# Patient Record
Sex: FEMALE | Race: WHITE | Employment: UNEMPLOYED | URBAN - METROPOLITAN AREA
[De-identification: names, ages, dates, MRNs, and addresses within clinical notes are randomized per-mention and may not be internally consistent; named-entity substitution may affect disease eponyms.]

---

## 2020-01-24 ENCOUNTER — HOSPITAL ENCOUNTER (INPATIENT)
Age: 20
LOS: 3 days | Discharge: HOME | DRG: 885 | End: 2020-01-27
Attending: PSYCHIATRY & NEUROLOGY | Admitting: PSYCHIATRY & NEUROLOGY
Payer: COMMERCIAL

## 2020-01-24 PROBLEM — F32.A DEPRESSION: Status: ACTIVE | Noted: 2020-01-24

## 2020-01-24 PROCEDURE — 65220000003 HC RM SEMIPRIVATE PSYCH

## 2020-01-24 PROCEDURE — 74011250637 HC RX REV CODE- 250/637: Performed by: PSYCHIATRY & NEUROLOGY

## 2020-01-24 RX ORDER — LAMOTRIGINE 25 MG/1
50 TABLET ORAL
COMMUNITY

## 2020-01-24 RX ORDER — DIPHENHYDRAMINE HYDROCHLORIDE 50 MG/ML
50 INJECTION, SOLUTION INTRAMUSCULAR; INTRAVENOUS
Status: DISCONTINUED | OUTPATIENT
Start: 2020-01-24 | End: 2020-01-24

## 2020-01-24 RX ORDER — ADHESIVE BANDAGE
30 BANDAGE TOPICAL DAILY PRN
Status: DISCONTINUED | OUTPATIENT
Start: 2020-01-24 | End: 2020-01-24

## 2020-01-24 RX ORDER — HALOPERIDOL 5 MG/ML
5 INJECTION INTRAMUSCULAR
Status: DISCONTINUED | OUTPATIENT
Start: 2020-01-24 | End: 2020-01-24

## 2020-01-24 RX ORDER — DIPHENHYDRAMINE HYDROCHLORIDE 50 MG/ML
50 INJECTION, SOLUTION INTRAMUSCULAR; INTRAVENOUS
Status: DISCONTINUED | OUTPATIENT
Start: 2020-01-24 | End: 2020-01-27 | Stop reason: HOSPADM

## 2020-01-24 RX ORDER — TRAZODONE HYDROCHLORIDE 50 MG/1
50 TABLET ORAL
Status: DISCONTINUED | OUTPATIENT
Start: 2020-01-24 | End: 2020-01-27 | Stop reason: HOSPADM

## 2020-01-24 RX ORDER — LORAZEPAM 2 MG/ML
1 INJECTION INTRAMUSCULAR
Status: DISCONTINUED | OUTPATIENT
Start: 2020-01-24 | End: 2020-01-27 | Stop reason: HOSPADM

## 2020-01-24 RX ORDER — HYDROXYZINE 25 MG/1
50 TABLET, FILM COATED ORAL
Status: DISCONTINUED | OUTPATIENT
Start: 2020-01-24 | End: 2020-01-24

## 2020-01-24 RX ORDER — OLANZAPINE 5 MG/1
5 TABLET ORAL
Status: DISCONTINUED | OUTPATIENT
Start: 2020-01-24 | End: 2020-01-24

## 2020-01-24 RX ORDER — HALOPERIDOL 5 MG/ML
5 INJECTION INTRAMUSCULAR
Status: DISCONTINUED | OUTPATIENT
Start: 2020-01-24 | End: 2020-01-27 | Stop reason: HOSPADM

## 2020-01-24 RX ORDER — HYDROXYZINE 25 MG/1
50 TABLET, FILM COATED ORAL
Status: DISCONTINUED | OUTPATIENT
Start: 2020-01-24 | End: 2020-01-27 | Stop reason: HOSPADM

## 2020-01-24 RX ORDER — ACETAMINOPHEN 325 MG/1
650 TABLET ORAL
Status: DISCONTINUED | OUTPATIENT
Start: 2020-01-24 | End: 2020-01-24

## 2020-01-24 RX ORDER — LORAZEPAM 2 MG/ML
1 INJECTION INTRAMUSCULAR
Status: DISCONTINUED | OUTPATIENT
Start: 2020-01-24 | End: 2020-01-24

## 2020-01-24 RX ORDER — PSEUDOEPHEDRINE HCL 30 MG
60 TABLET ORAL
Status: DISCONTINUED | OUTPATIENT
Start: 2020-01-24 | End: 2020-01-24

## 2020-01-24 RX ORDER — OLANZAPINE 5 MG/1
5 TABLET ORAL
Status: DISCONTINUED | OUTPATIENT
Start: 2020-01-24 | End: 2020-01-27 | Stop reason: HOSPADM

## 2020-01-24 RX ORDER — PSEUDOEPHEDRINE HCL 30 MG
60 TABLET ORAL
Status: DISCONTINUED | OUTPATIENT
Start: 2020-01-24 | End: 2020-01-27 | Stop reason: HOSPADM

## 2020-01-24 RX ORDER — TRAZODONE HYDROCHLORIDE 50 MG/1
50 TABLET ORAL
Status: DISCONTINUED | OUTPATIENT
Start: 2020-01-24 | End: 2020-01-24

## 2020-01-24 RX ORDER — ADHESIVE BANDAGE
30 BANDAGE TOPICAL DAILY PRN
Status: DISCONTINUED | OUTPATIENT
Start: 2020-01-24 | End: 2020-01-27 | Stop reason: HOSPADM

## 2020-01-24 RX ORDER — LAMOTRIGINE 25 MG/1
50 TABLET ORAL EVERY EVENING
Status: DISCONTINUED | OUTPATIENT
Start: 2020-01-24 | End: 2020-01-24

## 2020-01-24 RX ORDER — NICOTINE 7MG/24HR
1 PATCH, TRANSDERMAL 24 HOURS TRANSDERMAL DAILY
Status: DISCONTINUED | OUTPATIENT
Start: 2020-01-24 | End: 2020-01-27 | Stop reason: HOSPADM

## 2020-01-24 RX ORDER — BENZTROPINE MESYLATE 1 MG/1
1 TABLET ORAL
Status: DISCONTINUED | OUTPATIENT
Start: 2020-01-24 | End: 2020-01-24

## 2020-01-24 RX ORDER — NICOTINE 7MG/24HR
1 PATCH, TRANSDERMAL 24 HOURS TRANSDERMAL DAILY
Status: DISCONTINUED | OUTPATIENT
Start: 2020-01-24 | End: 2020-01-24

## 2020-01-24 RX ORDER — LAMOTRIGINE 25 MG/1
50 TABLET ORAL
Status: DISCONTINUED | OUTPATIENT
Start: 2020-01-25 | End: 2020-01-27 | Stop reason: HOSPADM

## 2020-01-24 RX ORDER — BENZTROPINE MESYLATE 1 MG/1
1 TABLET ORAL
Status: DISCONTINUED | OUTPATIENT
Start: 2020-01-24 | End: 2020-01-27 | Stop reason: HOSPADM

## 2020-01-24 RX ORDER — ACETAMINOPHEN 325 MG/1
650 TABLET ORAL
Status: DISCONTINUED | OUTPATIENT
Start: 2020-01-24 | End: 2020-01-27 | Stop reason: HOSPADM

## 2020-01-24 RX ADMIN — HYDROXYZINE HYDROCHLORIDE 50 MG: 25 TABLET, FILM COATED ORAL at 16:25

## 2020-01-24 RX ADMIN — TRAZODONE HYDROCHLORIDE 50 MG: 50 TABLET ORAL at 22:01

## 2020-01-24 RX ADMIN — PSEUDOEPHEDRINE HCL 60 MG: 30 TABLET, FILM COATED ORAL at 16:25

## 2020-01-24 RX ADMIN — PSEUDOEPHEDRINE HCL 60 MG: 30 TABLET, FILM COATED ORAL at 22:01

## 2020-01-24 NOTE — PROGRESS NOTES
[facility] Admission Pharmacy Medication Reconciliation     Recommendations/Findings:   1) Updated patient's preferred pharmacy to [facility] on [facility]    The following changes were made to the PTA medication list:   Additions:   lamotrigine  Modifications:   None  Deletions:   None      Total Time Spent: 5 minutes    Information obtained from: Patient interview    Past Medical History/Disease States:  No past medical history on file. Patient allergies: Allergies as of 01/24/2020    (No Known Allergies)         Prior to Admission Medications   Prescriptions Last Dose Informant Patient Reported? Taking?   lamoTRIgine (LAMICTAL) 25 mg tablet   Yes Yes   Sig: Take 50 mg by mouth nightly.  Indications: Mood disorder      Facility-Administered Medications: None         Thank you,  [facility]  Desk: [phone] ([id number]  Pharmacy: [phone] ([id number])

## 2020-01-24 NOTE — BH NOTES
0 Pt admitted voluntarily to [place] [address] Unit for worsening S/I with no plan. Reporting facility said she has fleeting thoughts of driving into a tree. Also reports depression and anxiety. Stressors include family, school, and a relationship that recently ended. No known allergies. Drug screen positive for THC. Skin assessment done by Wen Johnson RN and Earnest Castillo RN, tattoo to left upper arm, no other abnormalities noted. Calm and cooperative with admission process. Reports feels suicidal \"just in general\" as well as depression and anxiety. Denies H/I and AV hallucinations. States her best support system is her friends. Smokes marijuana on a daily basis to relax. No history of physical/mental/sexual abuse. No chronic medical problems. Denies pain. Complaint of nasal congestion. Vital signs WNL. Also mentioned she has difficulty sleeping at night. Regularly attends [facility] Therapy. Her only home medication is Lamictal. Pt oriented to unit.

## 2020-01-24 NOTE — PROGRESS NOTES
Problem: Depressed Mood (Adult/Pediatric)  Goal: *STG: Verbalizes anger, guilt, and other feelings in a constructive manor  Outcome: Progressing Towards Goal  Goal: *STG: Attends activities and groups  Outcome: Progressing Towards Goal  Goal: *STG: Demonstrates reduction in symptoms and increase in insight into coping skills/future focused  Outcome: Progressing Towards Goal  Goal: *STG: Remains safe in hospital  Outcome: Progressing Towards Goal  Goal: *STG: Complies with medication therapy  Outcome: Progressing Towards Goal  Goal: *LTG: Returns to previous level of functioning and participates with after care plan  Outcome: Progressing Towards Goal  Goal: *LTG: Understands illness and can identify signs of relapse  Outcome: Progressing Towards Goal     Problem: Suicide  Goal: *STG: Remains safe in hospital  Outcome: Progressing Towards Goal  Goal: *STG:  Verbalizes alternative ways of dealing with maladaptive feelings/behaviors  Outcome: Progressing Towards Goal  Goal: *STG/LTG: Complies with medication therapy  Outcome: Progressing Towards Goal  Goal: *STG/LTG: No longer expresses self destructive or suicidal thoughts  Outcome: Progressing Towards Goal     Progressing.

## 2020-01-24 NOTE — GROUP NOTE
[facility] GROUP DOCUMENTATION INDIVIDUAL Group Therapy Note Date: 1/24/2020 Group Start Time: 1400 Group End Time: 1500 Group Topic: Primary Group [facility] Vernon Orozco [facility] GROUP DOCUMENTATION GROUP Group Therapy Note Attendees: 8 Attendance: Attended Patient's Goal:   Pt processed the process of grief and how it applies to addictions and substance abuse. Pt identified various stages of grief and how they can be translated into recovery. Pt worked with peers to identify behaviors and which stage of grief that they can be associated with. Interventions/techniques: Challenged, Informed, Validated, Provide feedback and Role playing Follows Directions: Followed directions Interactions: Interacted appropriately Mental Status: Anxious, Calm and Congruent Behavior/appearance: Attentive and Cooperative Goals Achieved: Able to listen to others, Able to give feedback to another, Able to reflect/comment on own behavior, Able to receive feedback, Able to experience relief/decrease in symptoms and Able to self-disclose Additional Notes:  Pt stated that she has often blamed her parents and her circumstances when she was angry, and states that she is often grieving the loss of a childhood she wishes she had. Josh Hameed

## 2020-01-24 NOTE — PROGRESS NOTES
1600 Pt attending group in the dayroom. Social with peers and staff. Smiling, pleasant mood. Positive for S/I, no plan. Denies H/I and AV hallucinations. 1625 PRN Atarax given for pt c/o anxiety. PRN sudafed given for pt c/o nasal congestion/cold symptoms. Scheduled nicotine patch applied. 1730 Sitting in the dayroom. Feels that the PRN medications were helpful. 1800 Talking on the telephone in the dayroom. No issues noted.

## 2020-01-25 PROCEDURE — 74011250637 HC RX REV CODE- 250/637: Performed by: NURSE PRACTITIONER

## 2020-01-25 PROCEDURE — 65220000003 HC RM SEMIPRIVATE PSYCH

## 2020-01-25 PROCEDURE — 74011250637 HC RX REV CODE- 250/637: Performed by: PSYCHIATRY & NEUROLOGY

## 2020-01-25 RX ORDER — PRAZOSIN HYDROCHLORIDE 1 MG/1
2 CAPSULE ORAL
Status: DISCONTINUED | OUTPATIENT
Start: 2020-01-25 | End: 2020-01-27 | Stop reason: HOSPADM

## 2020-01-25 RX ADMIN — PRAZOSIN HYDROCHLORIDE 2 MG: 1 CAPSULE ORAL at 21:08

## 2020-01-25 RX ADMIN — LAMOTRIGINE 50 MG: 25 TABLET ORAL at 21:08

## 2020-01-25 RX ADMIN — TRAZODONE HYDROCHLORIDE 50 MG: 50 TABLET ORAL at 21:08

## 2020-01-25 RX ADMIN — PSEUDOEPHEDRINE HCL 60 MG: 30 TABLET, FILM COATED ORAL at 22:35

## 2020-01-25 NOTE — BH NOTES
[id number] Report received from Scotty 58      1930-Patient in bed resting, patient denies /HI/ [facility]    Patient stayed in room after being asked the assessment questions. 2201- Requested something for the congesting she was having as well as something for sleep. Patient was given trazodone and sudafed. Patient has been resting    Patient has been resting through the shift with not other complaints.     Hourly Rounds completed, patient slept approx 8 hrs

## 2020-01-25 NOTE — BH NOTES
PSYCHOSOCIAL ASSESSMENT  :Patient identifying info:  Ana Goldberg is a 20 y.o., female admitted 1/24/2020  1:48 PM     Presenting problem and precipitating factors: Pt presented to [facility] ED voluntarily due to worsening suicidal ideations without a plan. Pt reports that she often \"goes on manic episodes. I'll get a wild idea, and then go do it without any kind of planning. \" Pt reports wanting to seek help for ideations because \"I know that just because I don't currently have a plan doesn't mean I'm safe to not kill myself. \" Patient understands how impulsive she can be and wanted to protect herself from her ideations. Patient states that she has been having nightmares, and has had worsening depressive symptoms. Mental status assessment: Patient is calm and cooperative, and understand how impulsive she can be. Pt is oriented and insightful. Strengths:  Patient has a high level of insight to her own mental health needs, has good coping skills, and is currently attending therapy. Collateral information: Shamika Condon [phone]    Current psychiatric /substance abuse providers and contact info: To Be Linked    Previous psychiatric/substance abuse providers and response to treatment: Currently seeing therapist.     Family history of mental illness or substance abuse: None reported. Substance abuse history:  Patient reports starting to use alcohol at age 14, and by 22 was drinking heavily. Patient states that she started smoking at that same age, and is still smoking. Social History     Tobacco Use    Smoking status: Not on file   Substance Use Topics    Alcohol use: Not on file       History of biomedical complications associated with substance abuse : None. Patient's current acceptance of treatment or motivation for change: Pt is highly motivated for change. Family constellation: Pt lives with friends, but relies on her parents for support.  Pt is often frustrated that neither of them seem to understand what mental illness is, and \"are willfully ignorant\" about her needs. Is significant other involved? None      Describe support system: Pt has \"friends that are like family\" that have been her emotional support. Pt is often frustrated with her family stating, \"I can't tell them fuck you yet, because I still need their financial support\" but she feels more comfortable with her friends. Describe living arrangements and home environment: Stable, but complicated. Currently lives alone, but patient states that [place] though I've created a safe space, I just feel like it's a cave. \"   Health issues:   Hospital Problems  Never Reviewed          Codes Class Noted POA    Depression ICD-10-CM: F32.9  ICD-9-CM: 464  2020 Unknown              Trauma history: Pt reports her earliest traumas are at age 1, when her father was deployed for the first time. Legal issues: None    History of  service: None, but she is a part of a [facility] family. Financial status: Not currently employed, but supported by family. Buddhism/cultural factors: none specified. Education/work history: Currently attending college, and [employer] a normal life. \"     Have you been licensed as a health care professional (current or ):  No    Leisure and recreation preferences: Spending time with family and friends. Describe coping skills: effective. Pt has a high level of insight.      Georges Darnell  2020

## 2020-01-25 NOTE — H&P
Hospitalist Admission Note    NAME: Cb Small   :  2000   MRN:  [id number]     Date/Time:  2020 3:07 PM    Patient PCP: Ambrocio Guzman MD  ______________________________________________________________________  Given the patient's current clinical presentation, I have a high level of concern for decompensation if discharged from the emergency department. Complex decision making was performed, which includes reviewing the patient's available past medical records, laboratory results, and x-ray films. My assessment of this patient's clinical condition and my plan of care is as follows. Assessment / Plan:  Patient 79-year-old female admitted for depression, suicidal ideation hospitalist consulted for medical clearance. Patient reports no significant chronic medical condition. States admitted for depression, denies hypertension hyperlipidemia diabetes. Chart reviewed patient interviewed patient medically clear no need for any active intervention. Hospitalist will sign off        Code Status: Full code  Surrogate Decision Maker:    DVT Prophylaxis: Not indicated  GI Prophylaxis: not indicated        Subjective:   CHIEF COMPLAINT: Consult from behavioral health unit for medical clearance    HISTORY OF PRESENT ILLNESS:     Cb Small is a 20 y.o.  female who presents with depression and suicidal ideation. Hospitalist consulted for medical clearance patient denies any significant past medical history. No chronic EtOH abuse. Denies any using any illicit drugs. ,  Occasional marijuana. Patient reports some nasal congestion otherwise feeling okay. We were asked to admit for work up and evaluation of the above problems. No past medical history on file. No past surgical history on file. Social History     Tobacco Use    Smoking status: Not on file   Substance Use Topics    Alcohol use: Not on file        No family history on file.   No Known Allergies Prior to Admission medications    Medication Sig Start Date End Date Taking? Authorizing Provider   lamoTRIgine (LAMICTAL) 25 mg tablet Take 50 mg by mouth nightly. Indications: Mood disorder   Yes Provider, Historical       REVIEW OF SYSTEMS:     I am not able to complete the review of systems because:    The patient is intubated and sedated    The patient has altered mental status due to his acute medical problems    The patient has baseline aphasia from prior stroke(s)    The patient has baseline dementia and is not reliable historian    The patient is in acute medical distress and unable to provide information           Total of 12 systems reviewed as follows:       POSITIVE= underlined text  Negative = text not underlined  General:  fever, chills, sweats, generalized weakness, weight loss/gain,      loss of appetite   Eyes:    blurred vision, eye pain, loss of vision, double vision  ENT:    rhinorrhea, pharyngitis   Respiratory:   cough, sputum production, SOB, ROMEO, wheezing, pleuritic pain   Cardiology:   chest pain, palpitations, orthopnea, PND, edema, syncope   Gastrointestinal:  abdominal pain , N/V, diarrhea, dysphagia, constipation, bleeding   Genitourinary:  frequency, urgency, dysuria, hematuria, incontinence   Muskuloskeletal :  arthralgia, myalgia, back pain  Hematology:  easy bruising, nose or gum bleeding, lymphadenopathy   Dermatological: rash, ulceration, pruritis, color change / jaundice  Endocrine:   hot flashes or polydipsia   Neurological:  headache, dizziness, confusion, focal weakness, paresthesia,     Speech difficulties, memory loss, gait difficulty  Psychological: Feelings of anxiety, depression, agitation    Objective:   VITALS:    Visit Vitals  BP 97/58 (BP 1 Location: Right arm, BP Patient Position: Sitting)   Pulse (!) 106   Temp 97.4 °F (36.3 °C)   Resp 18   Ht 5' 2\" (1.575 m)   Wt 46.3 kg (102 lb)   SpO2 100%   BMI 18.66 kg/m²       PHYSICAL EXAM:    General:    Alert, cooperative, no distress, appears stated age. HEENT: Atraumatic, anicteric sclerae, pink conjunctivae     No oral ulcers, mucosa moist, throat clear, dentition fair  Neck:  Supple, symmetrical,  thyroid: non tender  Lungs:   Clear to auscultation bilaterally. No Wheezing or Rhonchi. No rales. Chest wall:  No tenderness  No Accessory muscle use. Heart:   Regular  rhythm,  No  murmur   No edema  Abdomen:   Soft, non-tender. Not distended. Bowel sounds normal  Extremities: No cyanosis. No clubbing,      Skin turgor normal, Capillary refill normal, Radial dial pulse 2+  Skin:     Not pale. Not Jaundiced  No rashes   Psych:  Good insight. Not depressed. Not anxious or agitated. Neurologic: EOMs intact. No facial asymmetry. No aphasia or slurred speech. Symmetrical strength, Sensation grossly intact. Alert and oriented X 4.     _______________________________________________________________________  Care Plan discussed with:    Comments   Patient     Family      RN     Care Manager                    Consultant:      _______________________________________________________________________  Expected  Disposition:   Home with Family    HH/PT/OT/RN    SNF/LTC    [facility]    ________________________________________________________________________  TOTAL TIME: 20 minutes    Critical Care Provided     Minutes non procedure based      Comments     Reviewed previous records   >50% of visit spent in counseling and coordination of care  Discussion with patient and/or family and questions answered       ________________________________________________________________________  Signed: Estrella Sol MD    Procedures: see electronic medical records for all procedures/Xrays and details which were not copied into this note but were reviewed prior to creation of Plan. LAB DATA REVIEWED:    No results found for this or any previous visit (from the past 24 hour(s)).

## 2020-01-25 NOTE — BH NOTES
Behavioral Health Interdisciplinary Rounds     Patient Name: Jeffrey Arenas  Age: 20 y.o. Room/Bed:  [facility]/  Primary Diagnosis: <principal problem not specified>   Admission Status: Voluntary     Readmission within 30 days: no  Power of  in place: no  Patient requires a blocked bed: no          Reason for blocked bed:     Sleep hours: 8 hrs       Participation in Care/Groups:  no  Medication Compliant?: Yes  PRNS (last 24 hours):  Antianxiety and Sleep Aid    Restraints (last 24 hours):  no  Substance Abuse:  no    24 hour chart check complete: yes     Patient goal(s) for today:   Treatment team focus/goals:   Progress note:     LOS:  1  Expected LOS:     Financial concerns/prescription coverage:  no  Family contact:       Family requesting physician contact today:  no  Discharge plan:   Access to weapons: no        Outpatient provider(s):   Patient's preferred phone number for follow up call:     Participating treatment team members: Jeffrey Arenas (assigned SW),

## 2020-01-25 NOTE — PROGRESS NOTES
0730 Pt walking in the hallway talking with peers. Ate breakfast in the dayroom. Compliant with vital signs and medication. Did not sleep well, pt blames intense dreams. Remains depressed. Denies S/I at this time. Also denies anxiety, H/I, and AV hallucinations. 0930 Pt in the dayroom participating in group. 1200 Sitting in the dayroom during lunch. Talking with peers. 1500 Resting in bed with eyes closed. 1800 Walking in the hallway and talking with peers. Joking at times, smiling. No complaints voiced.

## 2020-01-25 NOTE — PROGRESS NOTES
Problem: Depressed Mood (Adult/Pediatric)  Goal: *STG: Verbalizes anger, guilt, and other feelings in a constructive manor  Outcome: Progressing Towards Goal  Goal: *STG: Attends activities and groups  Outcome: Progressing Towards Goal  Goal: *STG: Remains safe in hospital  Outcome: Progressing Towards Goal  Goal: *STG: Complies with medication therapy  Outcome: Progressing Towards Goal  Goal: *LTG: Returns to previous level of functioning and participates with after care plan  Outcome: Progressing Towards Goal  Goal: *LTG: Understands illness and can identify signs of relapse  Outcome: Progressing Towards Goal     Problem: Suicide  Goal: *STG/LTG: No longer expresses self destructive or suicidal thoughts  Outcome: Progressing Towards Goal     Progressing.

## 2020-01-25 NOTE — PROGRESS NOTES
Chief Complaint:  \"I am good\"  Length of Stay: 1 Days    Interval History:  Patient is calm and cooperative in assessment. Denies SI/HI/AVH and denies any adverse reactions to her medications. Patient endorses having nightmares 4-5 nights a week. Prazosin prescribed. Patient is compliant with medications and staff. Interacting with peers in milieu and attending groups. Past Medical History:  No past medical history on file.         Labs:  No results found for: WBC, WBCLT, HGBPOC, HGB, HGBP, HCTPOC, HCT, PHCT, RBCH, PLT, MCV, HGBEXT, HCTEXT, PLTEXT No results found for: NA, K, CL, CO2, AGAP, GLU, BUN, CREA, BUCR, GFRAA, GFRNA, CA, TBIL, TBILI, GPT, SGOT, AP, TP, ALB, GLOB, AGRAT, ALT   Vitals:    01/24/20 1410 01/24/20 2023 01/25/20 0748   BP: 122/76 101/65 97/58   Pulse: 73 94 (!) 106   Resp: 16 20 18   Temp: 98.4 °F (36.9 °C) 97.8 °F (36.6 °C) 97.4 °F (36.3 °C)   SpO2: 100% 100% 100%   Weight: 46.3 kg (102 lb)     Height: 5' 2\" (1.575 m)           Current Facility-Administered Medications   Medication Dose Route Frequency Provider Last Rate Last Dose    prazosin (MINIPRESS) capsule 2 mg  2 mg Oral QHS Noris James, PAULA        OLANZapine (ZyPREXA) tablet 5 mg  5 mg Oral Q6H PRN Inder Houston MD        haloperidol lactate (HALDOL) injection 5 mg  5 mg IntraMUSCular Q6H PRN Inder Houston MD        benztropine (COGENTIN) tablet 1 mg  1 mg Oral BID PRN Inder Houston MD        diphenhydrAMINE (BENADRYL) injection 50 mg  50 mg IntraMUSCular BID PRN Inder Houston MD        hydroxyzine HCL (ATARAX) tablet 50 mg  50 mg Oral TID PRN Inder Houston MD   50 mg at 01/24/20 1625    LORazepam (ATIVAN) injection 1 mg  1 mg IntraMUSCular Q4H PRN Inder Houston MD        traZODone (DESYREL) tablet 50 mg  50 mg Oral QHS PRN Inder Houston MD   50 mg at 01/24/20 2201    acetaminophen (TYLENOL) tablet 650 mg  650 mg Oral Q4H PRN Alistair Lee MD  magnesium hydroxide (MILK OF MAGNESIA) 400 mg/5 mL oral suspension 30 mL  30 mL Oral DAILY PRN Inder Houston MD        nicotine (NICODERM CQ) 7 mg/24 hr patch 1 Patch  1 Patch TransDERmal DAILY Inder Houston MD   1 Patch at 01/25/20 0831    lamoTRIgine (LaMICtal) tablet 50 mg  50 mg Oral QHS Inder Houston MD        pseudoephedrine (SUDAFED) tablet 60 mg  60 mg Oral Q6H PRN Inder Houston MD   60 mg at 01/24/20 2201         Mental Status Exam:  Eye contact: Good  Psychomotor activity: calm  Speech is spontaneous  Mood is congruent  Affect: full range  Perception:Denies HI/AVH  Suicidal ideation: Denies SI or plan  Cognition is grossly intact         Physical Exam:  Body habitus:  Musculoskeletal system:   Tremor -neg  Cog wheeling-neg      Assessment and Plan:  Marylee Or meets criteria for a diagnosis of Major Depressive Disorder, severe, recurrent. Continue the medication regimen as prescribed  Disposition planning to continue. I certify that this patients inpatient psychiatric hospital services furnished since the previous certification were, and continue to be, required for treatment that could reasonably be expected to improve the patient's condition, or for diagnostic study, and that the patient continues to need, on a daily basis, active treatment furnished directly by or requiring the supervision of inpatient psychiatric facility personnel. In addition, the hospital records show that services furnished were intensive treatment services, admission or related services, or equivalent services.

## 2020-01-25 NOTE — PROGRESS NOTES
Problem: Depressed Mood (Adult/Pediatric)  Goal: *STG: Remains safe in hospital  Outcome: Progressing Towards Goal  Goal: *STG: Complies with medication therapy  Outcome: Progressing Towards Goal

## 2020-01-26 PROCEDURE — 74011250637 HC RX REV CODE- 250/637: Performed by: PSYCHIATRY & NEUROLOGY

## 2020-01-26 PROCEDURE — 65220000003 HC RM SEMIPRIVATE PSYCH

## 2020-01-26 PROCEDURE — 74011250637 HC RX REV CODE- 250/637: Performed by: NURSE PRACTITIONER

## 2020-01-26 RX ADMIN — PRAZOSIN HYDROCHLORIDE 2 MG: 1 CAPSULE ORAL at 21:33

## 2020-01-26 RX ADMIN — LAMOTRIGINE 50 MG: 25 TABLET ORAL at 21:33

## 2020-01-26 RX ADMIN — TRAZODONE HYDROCHLORIDE 50 MG: 50 TABLET ORAL at 21:33

## 2020-01-26 RX ADMIN — HYDROXYZINE HYDROCHLORIDE 50 MG: 25 TABLET, FILM COATED ORAL at 12:28

## 2020-01-26 RX ADMIN — HYDROXYZINE HYDROCHLORIDE 50 MG: 25 TABLET, FILM COATED ORAL at 20:05

## 2020-01-26 NOTE — BH NOTES
GROUP THERAPY PROGRESS NOTE    Neida Ambrosio is participating in Goals Group and Reflection.      Group time: 20 minutes    Goal orientation: personal    Group therapy participation: active    Therapeutic interventions reviewed and discussed: Setting goals while in the hospital, as well as reflecting on their day      Impression of participation: Patient activly listening, and encouraged to set personal goals while in the hospital and also once dischaged     Group led by Kimberly Hidalgo LPN

## 2020-01-26 NOTE — GROUP NOTE
[facility]  GROUP DOCUMENTATION INDIVIDUAL Group Therapy Note Date: 1/25/2020 Group Start Time: 3122 Group End Time: 7321 Group Topic: Social Work Group [facility] Dom Mayorga [facility] GROUP DOCUMENTATION GROUP Group Therapy Note Attendees: 5 Attendance: Attended Patient's Goal:  Pt processed using positive coping skills to combat depression and anxiety. Pt identified several skills that use humor and activity that can help diffuse the symptoms of anxiety. Interventions/techniques: Challenged, Informed, Validated and Reinforced Follows Directions: Followed directions Interactions: Interacted appropriately Mental Status: Calm and Congruent Behavior/appearance: Attentive and Cooperative Goals Achieved: Able to engage in interactions, Able to listen to others, Able to give feedback to another, Able to reflect/comment on own behavior, Able to manage/cope with feelings, Able to receive feedback and Able to self-disclose Additional Notes:  Pt was tearful at times, even when discussing humor, stating she had a hard time \"feeling [place]. \" Pt gave good feedback to peers. Valentin Ackerman

## 2020-01-26 NOTE — BH NOTES
1930  Assumed care of patient from day shift nurse. 2000 Patient observed walking the hallway with peers. No voiced concerns at this time. Denies SI/HI/AVH at this time. 2108 Med compliant. No c/o pain or discomfort.     0600 Patient in bed sleeping. Slept a total of 8hrs this shift.

## 2020-01-26 NOTE — PROGRESS NOTES
Chief Complaint:  \"I am good\"  Length of Stay: 2 Days    Interval History:  Patient is calm and cooperative in assessment. Denies SI/HI/AVH and denies any adverse reactions to her medications. Patient endorses sleeping well last night and states since she started on the Prazosin yesterday she has not had a nightmare. She states, \"I finally got a restful nights sleep last night. \" Patient in dayroom watching tv and interacting with peers. Past Medical History:  No past medical history on file.         Labs:  No results found for: WBC, WBCLT, HGBPOC, HGB, HGBP, HCTPOC, HCT, PHCT, RBCH, PLT, MCV, HGBEXT, HCTEXT, PLTEXT, HGBEXT, HCTEXT, PLTEXT No results found for: NA, K, CL, CO2, AGAP, GLU, BUN, CREA, BUCR, GFRAA, GFRNA, CA, TBIL, TBILI, GPT, SGOT, AP, TP, ALB, GLOB, AGRAT, ALT   Vitals:    01/24/20 2023 01/25/20 0748 01/25/20 2000 01/26/20 0807   BP: 101/65 97/58 114/69 106/68   Pulse: 94 (!) 106 99 100   Resp: 20 18 18 18   Temp: 97.8 °F (36.6 °C) 97.4 °F (36.3 °C) 97.4 °F (36.3 °C) 97.9 °F (36.6 °C)   SpO2: 100% 100% 99% 100%   Weight:       Height:             Current Facility-Administered Medications   Medication Dose Route Frequency Provider Last Rate Last Dose    prazosin (MINIPRESS) capsule 2 mg  2 mg Oral QHS Sudhir James NP   2 mg at 01/25/20 2108    OLANZapine (ZyPREXA) tablet 5 mg  5 mg Oral Q6H PRN Inder Houston MD        haloperidol lactate (HALDOL) injection 5 mg  5 mg IntraMUSCular Q6H PRN Inder Houston MD        benztropine (COGENTIN) tablet 1 mg  1 mg Oral BID PRN Inder Houston MD        diphenhydrAMINE (BENADRYL) injection 50 mg  50 mg IntraMUSCular BID PRN Inder Houston MD        hydroxyzine HCL (ATARAX) tablet 50 mg  50 mg Oral TID PRN Inder Houston MD   50 mg at 01/26/20 1228    LORazepam (ATIVAN) injection 1 mg  1 mg IntraMUSCular Q4H PRN Inder Houston MD        traZODone (DESYREL) tablet 50 mg  50 mg Oral QHS PRN Sheila Cunha MD   50 mg at 01/25/20 2108    acetaminophen (TYLENOL) tablet 650 mg  650 mg Oral Q4H PRN Inder Houston MD        magnesium hydroxide (MILK OF MAGNESIA) 400 mg/5 mL oral suspension 30 mL  30 mL Oral DAILY PRN Inder Houston MD        nicotine (NICODERM CQ) 7 mg/24 hr patch 1 Patch  1 Patch TransDERmal DAILY Inder Houston MD   1 Patch at 01/26/20 1234    lamoTRIgine (LaMICtal) tablet 50 mg  50 mg Oral QHS Inder Houston MD   50 mg at 01/25/20 2108    pseudoephedrine (SUDAFED) tablet 60 mg  60 mg Oral Q6H PRN Inder Houston MD   60 mg at 01/25/20 2235         Mental Status Exam:  Eye contact: Good  Psychomotor activity: calm  Speech is spontaneous  Mood is congruent  Affect: full range  Perception:Denies HI/AVH  Suicidal ideation: Denies SI or plan  Cognition is grossly intact         Physical Exam:  Body habitus:  Musculoskeletal system:   Tremor -neg  Cog wheeling-neg      Assessment and Plan:  Alannah Ocampo meets criteria for a diagnosis of Major Depressive Disorder, severe, recurrent. Continue the medication regimen as prescribed  Disposition planning to continue. I certify that this patients inpatient psychiatric hospital services furnished since the previous certification were, and continue to be, required for treatment that could reasonably be expected to improve the patient's condition, or for diagnostic study, and that the patient continues to need, on a daily basis, active treatment furnished directly by or requiring the supervision of inpatient psychiatric facility personnel. In addition, the hospital records show that services furnished were intensive treatment services, admission or related services, or equivalent services.

## 2020-01-26 NOTE — PROGRESS NOTES
Problem: Depressed Mood (Adult/Pediatric)  Goal: *STG: Participates in treatment plan  Outcome: Progressing Towards Goal  Goal: *STG: Participates in 1:1 therapy sessions  Outcome: Progressing Towards Goal  Goal: *STG: Verbalizes anger, guilt, and other feelings in a constructive manor  Outcome: Progressing Towards Goal  Goal: *STG: Attends activities and groups  Outcome: Progressing Towards Goal  Goal: *STG: Demonstrates reduction in symptoms and increase in insight into coping skills/future focused  Outcome: Progressing Towards Goal  Goal: *STG: Remains safe in hospital  Outcome: Progressing Towards Goal  Goal: *STG: Complies with medication therapy  Outcome: Progressing Towards Goal  Goal: *LTG: Returns to previous level of functioning and participates with after care plan  Outcome: Progressing Towards Goal  Goal: *LTG: Understands illness and can identify signs of relapse  Outcome: Progressing Towards Goal  Goal: Interventions  Outcome: Progressing Towards Goal     Problem: Suicide  Goal: *STG: Remains safe in hospital  Outcome: Progressing Towards Goal  Goal: *STG: Seeks staff when feelings of self harm or harm towards others arise  Outcome: Progressing Towards Goal  Goal: *STG: Attends activities and groups  Outcome: Progressing Towards Goal  Goal: *STG:  Verbalizes alternative ways of dealing with maladaptive feelings/behaviors  Outcome: Progressing Towards Goal  Goal: *STG/LTG: Complies with medication therapy  Outcome: Progressing Towards Goal  Goal: *STG/LTG: No longer expresses self destructive or suicidal thoughts  Outcome: Progressing Towards Goal  Goal: *LTG:  Identifies available community resources  Outcome: Progressing Towards Goal  Goal: *LTG:  Develops proactive suicide prevention plan  Outcome: Progressing Towards Goal  Goal: Interventions  Outcome: Progressing Towards Goal

## 2020-01-26 NOTE — BH NOTES
0800 pt is visible on the unit. Calm and cooperative. Denies si/hi/a/v [place]. Ate breakfast.     1000 pt is visible on the unit. 1200 pt ate lunch. Is visible on the unit. 1400 pt is visible on the unit. 1600 pt is visible on the unit. 1800 pt is visible on the unit. Ate dinner.

## 2020-01-26 NOTE — BH NOTES
[facility] Interdisciplinary Rounds Patient Name: Zaid Jones  Age: 20 y.o. Room/Bed:  Formerly named [facility] Primary Diagnosis: <principal problem not specified> Admission Status: { Admission Status:19776} Readmission within 30 days: {Yes/No:19414::\"no\"} Power of  in place: {Yes/No:19414::\"no\"} Patient requires a blocked bed: {Yes/No:19414::\"no\"} Reason for blocked bed: *** Order for blocked bed obtained: {Yes/No:19414::\"no\"} Sleep hours: *** Participation in Care/Groups:  {Yes/No:19414::\"no\"} Medication Compliant?: { Medication Compliant:23076} PRNS (last 24 hours): { PRNS:23077} Restraints (last 24 hours):  {Yes/No:19414::\"no\"} Substance Abuse:  {Yes/No:19414::\"no\"} 24 hour chart check complete: {Yes/No:19414::\"no\"}

## 2020-01-26 NOTE — PROGRESS NOTES
Problem: Depressed Mood (Adult/Pediatric)  Goal: *STG: Verbalizes anger, guilt, and other feelings in a constructive manor  Outcome: Progressing Towards Goal     Problem: Depressed Mood (Adult/Pediatric)  Goal: *STG: Attends activities and groups  Outcome: Progressing Towards Goal     Problem: Depressed Mood (Adult/Pediatric)  Goal: *STG: Remains safe in hospital  Outcome: Progressing Towards Goal

## 2020-01-27 VITALS
DIASTOLIC BLOOD PRESSURE: 61 MMHG | RESPIRATION RATE: 18 BRPM | BODY MASS INDEX: 18.77 KG/M2 | WEIGHT: 102 LBS | TEMPERATURE: 97.6 F | HEART RATE: 99 BPM | OXYGEN SATURATION: 98 % | SYSTOLIC BLOOD PRESSURE: 100 MMHG | HEIGHT: 62 IN

## 2020-01-27 PROCEDURE — 74011250637 HC RX REV CODE- 250/637: Performed by: PSYCHIATRY & NEUROLOGY

## 2020-01-27 RX ORDER — PRAZOSIN HYDROCHLORIDE 2 MG/1
2 CAPSULE ORAL
Qty: 30 CAP | Refills: 0 | Status: SHIPPED | OUTPATIENT
Start: 2020-01-27

## 2020-01-27 RX ORDER — TRAZODONE HYDROCHLORIDE 50 MG/1
50 TABLET ORAL
Qty: 30 TAB | Refills: 0 | Status: SHIPPED | OUTPATIENT
Start: 2020-01-27

## 2020-01-27 RX ORDER — HYDROXYZINE 50 MG/1
50 TABLET, FILM COATED ORAL
Qty: 90 TAB | Refills: 0 | Status: SHIPPED | OUTPATIENT
Start: 2020-01-27 | End: 2020-02-06

## 2020-01-27 RX ADMIN — HYDROXYZINE HYDROCHLORIDE 50 MG: 25 TABLET, FILM COATED ORAL at 08:49

## 2020-01-27 NOTE — DISCHARGE SUMMARY
PSYCHIATRIC DISCHARGE SUMMARY         IDENTIFICATION:    Patient Name  Marian Grubbs   Date of Birth 2000   [facility] [id number]   Medical Record Number  [id number]      Age  20 y.o. PCP Other, MD Ambrocio   Admit date:  1/24/2020    Discharge date: 1/27/2020   Room Number  326/01  @ [address]   Date of Service  1/27/2020            TYPE OF DISCHARGE: REGULAR               CONDITION AT DISCHARGE: improved       PROVISIONAL & DISCHARGE DIAGNOSES:    Problem List  Never Reviewed          Codes Class    Depression ICD-10-CM: F32.9  ICD-9-CM: [address] Problems    Depression        DISCHARGE DIAGNOSIS:   Axis I:  SEE ABOVE  Axis II: SEE ABOVE  Axis III: SEE ABOVE  Axis IV:  lack of structure  Axis V:  <50 on admission, 55+ on discharge     CC & HISTORY OF PRESENT ILLNESS:  58-year-old female admitted for depression, suicidal ideation hospitalist consulted for medical clearance. Patient reports no significant chronic medical condition. States admitted for depression, denies hypertension hyperlipidemia diabetes. Chart reviewed patient interviewed patient medically clear no need for any active intervention. Described self loathing and pessimistic view points here for management of her condition.      SOCIAL HISTORY:    Social History     Socioeconomic History    Marital status: SINGLE     Spouse name: Not on file    Number of children: Not on file    Years of education: Not on file    Highest education level: Not on file   Occupational History    Not on file   Social Needs    Financial resource strain: Not on file    Food insecurity:     Worry: Not on file     Inability: Not on file    Transportation needs:     Medical: Not on file     Non-medical: Not on file   Tobacco Use    Smoking status: Not on file   Substance and Sexual Activity    Alcohol use: Not on file    Drug use: Not on file    Sexual activity: Not on file   Lifestyle    Physical activity:     Days per week: Not on file     Minutes per session: Not on file    Stress: Not on file   Relationships    Social connections:     Talks on phone: Not on file     Gets together: Not on file     Attends Voodoo service: Not on file     Active member of club or organization: Not on file     Attends meetings of clubs or organizations: Not on file     Relationship status: Not on file    Intimate partner violence:     Fear of current or ex partner: Not on file     Emotionally abused: Not on file     Physically abused: Not on file     Forced sexual activity: Not on file   Other Topics Concern    Not on file   Social History Narrative    Not on file      FAMILY HISTORY:   No family history on file. HOSPITALIZATION COURSE:    Neida Ambrosio was admitted to the inpatient psychiatric unit [facility] for acute psychiatric stabilization in regards to symptomatology as described in the HPI above. The differential diagnosis at time of admission included: schizophrenia vs substance induced psychotic disorder schizoaffective vs bipolar MDD vs adjustment disorder. While on the unit Neida Ambrosio was involved in individual, group, occupational and milieu therapy. Psychiatric medications were adjusted during this hospitalization. Neida Ambrosio demonstrated a progressive improvement in overall condition. Much of patient's initial presentation appeared to be related to situational stressors, effects of medication non-compliance drugs of abuse, and psychological factors. Please see individual progress notes for more specific details regarding patient's hospitalization course. Mony Poon reports feeling more energetic over the past  2 days and moods are good. Denies SI/HI/AH/VH. No aggression or violence. No further self loathing and she is more optimistic. Appropriately interactive and aware. Tolerating medications well. Eating and sleeping fairly. Patient with request for discharge today.  There are no grounds to seek a TDO. At time of discharge, Ninfa Olguin is without significant problems of depression, psychosis, or ted. Patient free of suicidal and homicidal ideations (appears to be at very low risk of a chronic, elevated risk of suicide or homicide) and reports many positive predictive factors in terms of not attempting suicide or homicide. Overall presentation at time of discharge is most consistent with the diagnosis of PTSD. Patient has maximized benefit to be derived from acute inpatient psychiatric treatment. All members of the treatment team concur with each other in regards to plans for discharge today. Patient and family are aware and in agreement with discharge and discharge plan. LABS AND IMAGAING:    Labs Reviewed - No data to display  No results found for: DS35, PHEN, PHENO, PHENT, DILF, DS39, PHENY, PTN, VALF2, VALAC, VALP, VALPR, DS6, CRBAM, CRBAMP, CARB2, XCRBAM  No results found for any previous visit. No results found. DISPOSITION:    Home. Patient to f/u with  psychiatric, and psychotherapy appointments. Patient is to f/u with internist as directed. FOLLOW-UP CARE:    Activity as tolerated  Regular diet  Wound Care: none needed. Follow-up Information     Follow up With Specialties Details Why Contact Info    [place] [address]  [phone]    [facility]  On 2/4/2020 You have a 3:30pm appointment with the psychiatric nurse practitioner for medication management. [facility] [address], [address] [place] [address]  [phone]    Other, MD Ambrocio    Patient can only remember the practice name and not the physician                   PROGNOSIS:   Ramya End ---- based on nature of patient's pathology/ies and treatment compliance issues.   Prognosis is greatly dependent upon patient's ability to remain sober and to follow up with scheduled appointments as well as to comply with psychiatric medications as prescribed. DISCHARGE MEDICATIONS:     Informed consent given for the use of following psychotropic medications:  Current Discharge Medication List      START taking these medications    Details   hydroxyzine HCL (ATARAX) 50 mg tablet Take 1 Tab by mouth three (3) times daily as needed for Anxiety for up to 10 days. Indications: anxious  Qty: 90 Tab, Refills: 0      prazosin (MINIPRESS) 2 mg capsule Take 1 Cap by mouth nightly. Indications: posttraumatic stress syndrome  Qty: 30 Cap, Refills: 0      traZODone (DESYREL) 50 mg tablet Take 1 Tab by mouth nightly as needed for Sleep (For insomnia). Indications: insomnia associated with depression  Qty: 30 Tab, Refills: 0         CONTINUE these medications which have NOT CHANGED    Details   lamoTRIgine (LAMICTAL) 25 mg tablet Take 50 mg by mouth nightly. Indications: Mood disorder                    A coordinated, multidisplinary treatment team round was conducted with Saritha Maddox---this is done daily here at [facility]. This team consists of the nurse, psychiatric unit pharmacist,  and writer. I have spent greater than 35 minutes on discharge work.     Signed:  Ryan Banks MD  1/27/2020

## 2020-01-27 NOTE — BH NOTES
1930  Received report from TERRI, RN. Assumed care of the patient. 2000  Pt rates mood as \"good. \"  Pt looking forward to being discharged. Pt denies depression/AVH/SI/HI. Pt states she always has mild anxiety, currently 4/10. Pt reports last bm was today. Pt requested and was given prn atarax at 2005 2030  Pt in day [place] and ate 100% of evening snack. Observed socializing with peers    2100  Pt turned in nicotine patch    2130  Pt compliant with evening medication. Received prn trazodone    [id number]  Pt in bed asleep    Pt slept approximately 8 1/2 hours this shift.

## 2020-01-27 NOTE — BH NOTES
Behavioral Health Transition Record to Provider    Patient Name: Veronica Herrera  YOB: 2000  Medical Record Number: [id number]  Date of Admission: 1/24/2020  Date of Discharge: 1/27/2020    Attending Provider: Yakov De La Rosa MD  Discharging Provider: Yakov De La Rosa MD  To contact this individual call [phone] and ask the  to page. If unavailable, ask to be transferred to [place] Provider on call. [facility] Provider will be available on call 24/7 and during holidays. Primary Care Provider: Ambrocio Guzman MD    No Known Allergies    Reason for Admission: Patient is calm and cooperative in assessment. Denies SI/HI/AVH and denies any adverse reactions to her medications. Patient endorses having nightmares 4-5 nights a week. Prazosin prescribed. Patient is compliant with medications and staff. Interacting with peers in milieu and attending groups. Admission Diagnosis: Depression [F32.9]    * No surgery found *    No results found for this or any previous visit. Immunizations administered during this encounter: There is no immunization history on file for this patient. Screening for Metabolic Disorders for Patients on Antipsychotic Medications  (Data obtained from the EMR)    Estimated Body Mass Index  Estimated body mass index is 18.66 kg/m² as calculated from the following:    Height as of this encounter: 5' 2\" (1.575 m). Weight as of this encounter: 46.3 kg (102 lb).      Vital Signs/Blood Pressure  Visit Vitals  /61 (BP 1 Location: Right arm, BP Patient Position: Sitting)   Pulse 99   Temp 97.6 °F (36.4 °C)   Resp 18   Ht 5' 2\" (1.575 m)   Wt 46.3 kg (102 lb)   SpO2 98%   BMI 18.66 kg/m²       Blood Glucose/Hemoglobin A1c  No results found for: GLU, GLUCPOC    No results found for: HBA1C, HGBE8, [id number]     Lipid Panel  No results found for: CHOL, CHOLX, CHLST, CHOLV, 248839, HDL, HDLP, LDL, LDLC, DLDLP, TGLX, TRIGL, TRIGP, CHHD, [facility]     Discharge Diagnosis: Depression (ICD-10-CM: F32.9)    Discharge Plan: Patient discharged back to [facility] to retrieve her car and return home. DISCHARGE SUMMARY    Brendon Barakat  : 2000  MRN: [id number]    The patient Ana Goldberg exhibits the ability to control behavior in a less restrictive environment. Patient's level of functioning is improving. No assaultive/destructive behavior has been observed for the past 24 hours. No suicidal/homicidal threat or behavior has been observed for the past 24 hours. There is no evidence of serious medication side effects. Patient has not been in physical or protective restraints for at least the past 24 hours. If weapons involved, how are they secured? No weapons involved. Is patient aware of and in agreement with discharge plan? Yes    Arrangements for medication:  Prescriptions sent to [facility]. Copy of discharge instructions to provider?:  Bj Mancilla ([phone])    Arrangements for transportation home:  [place] all follow up appointments as scheduled, continue to take prescribed medications per physician instructions. Mental health crisis number:  910 or your local mental health crisis line number at [phone]. Discharge Medication List and Instructions:   Current Discharge Medication List      START taking these medications    Details   hydroxyzine HCL (ATARAX) 50 mg tablet Take 1 Tab by mouth three (3) times daily as needed for Anxiety for up to 10 days. Indications: anxious  Qty: 90 Tab, Refills: 0      prazosin (MINIPRESS) 2 mg capsule Take 1 Cap by mouth nightly. Indications: posttraumatic stress syndrome  Qty: 30 Cap, Refills: 0      traZODone (DESYREL) 50 mg tablet Take 1 Tab by mouth nightly as needed for Sleep (For insomnia).  Indications: insomnia associated with depression  Qty: 30 Tab, Refills: 0         CONTINUE these medications which have NOT CHANGED    Details   lamoTRIgine (LAMICTAL) 25 mg tablet Take 50 mg by mouth nightly. Indications: Mood disorder             Unresulted Labs (24h ago, onward)    None        To obtain results of studies pending at discharge, please contact [phone]    Follow-up Information     Follow up With Specialties Details Why Contact Calli    Dominick Collin  Schedule an appointment as soon as possible for a visit Call and schedule an appointment for individual therapy. [place] [address]  [phone]    Nisa Pinto  On 2/4/2020 You have a 3:30pm appointment with the psychiatric nurse practitioner for medication management. [facility] [address], [address] [place] [address]  [phone]    Other, MD Ambrocio    Patient can only remember the practice name and not the physician            Advanced Directive:   Does the patient have an appointed surrogate decision maker? No  Does the patient have a Medical Advance Directive? No  Does the patient have a Psychiatric Advance Directive? No  If the patient does not have a surrogate or Medical Advance Directive AND Psychiatric Advance Directive, the patient was offered information on these advance directives Yes and Patient declined to complete    Patient Instructions: Please continue all medications until otherwise directed by physician. Tobacco Cessation Discharge Plan:   Is the patient a smoker and needs referral for smoking cessation? Yes  Patient referred to the following for smoking cessation with an appointment? Refused     Patient was offered medication to assist with smoking cessation at discharge? Refused  Was education for smoking cessation added to the discharge instructions? Yes    Alcohol/Substance Abuse Discharge Plan:   Does the patient have a history of substance/alcohol abuse and requires a referral for treatment? Yes  Patient referred to the following for substance/alcohol abuse treatment with an appointment?  Yes, Patient has an appointment with Garry Augustine NP, on 2/4/2020 at 3:30pm.  Patient was offered medication to assist with alcohol cessation at discharge? Refused  Was education for substance/alcohol abuse added to discharge instructions? Yes    Patient discharged to Home; discussed with patient/caregiver and provided to the patient/caregiver either in hard copy or electronically.

## 2020-01-27 NOTE — DISCHARGE INSTRUCTIONS
Patient Education        Learning About Depression Screening  What is depression screening? Depression screening is a way to see if you have depression symptoms. It may be done by a doctor or counselor. This screening is often part of a routine checkup. That's because your mental health is just as important as your physical health. Depression is a medical illness that affects how you feel, think, and act. You may:  · Have less energy. · Lose interest in your daily activities. · Feel sad and grouchy for a long time. Depression is very common. It affects men and women of all ages. Many things can trigger depression. Some people become depressed after they have a stroke or find out they have a major illness like cancer or heart disease. The death of a loved one, a breakup, or changes in the natural brain chemicals may lead to depression. It can run in families. Most experts believe that a combination of family history (a person's genes) and stressful life events can cause depression. What happens during screening? Your doctor may ask about your feelings, any changes in eating habits, your energy level, and your interest in your daily tasks. He or she may ask other questions, such as how well you are sleeping and if you can focus on the things you do. This may be an informal talk between the two of you. Or your doctor may ask you to fill out a quick form and then talk about your answers. Some diseases or changes in your body can cause symptoms that look like depression. So your doctor may do blood tests to help rule out other problems, such as hormone changes, a low thyroid level, or anemia. What happens after screening? If you have signs of depression, your doctor will talk to you about your options. Doctors usually treat depression with medicines or counseling. Often, combining the two works best. Many people don't get help because they think that they'll get over the depression on their own.  But people with depression may not get better unless they get treatment. Many people feel embarrassed or ashamed about having depression. But it isn't a sign of personal weakness. It's not a character flaw. A person who is depressed is not \"crazy. \" Depression is caused by changes in the brain. A serious symptom of depression is thinking about death or suicide. If you or someone you care about talks about this or about feeling hopeless, get help right away. It's important to know that depression can be treated. The first step toward feeling better is often just seeing that the problem exists. Where can you learn more? Go to http://micahV2contactduncan.info/. Enter T185 in the search box to learn more about \"Learning About Depression Screening. \"  Current as of: May 28, 2019  Content Version: 12.2  © 0106-9229 [employer]. Care instructions adapted under license by [employer] (which disclaims liability or warranty for this information). If you have questions about a medical condition or this instruction, always ask your healthcare professional. [place] [address] any warranty or liability for your use of this information. DISCHARGE SUMMARY    Morales Loyola  : 2000  MRN: [id number]    The patient Zaid Jones exhibits the ability to control behavior in a less restrictive environment. Patient's level of functioning is improving. No assaultive/destructive behavior has been observed for the past 24 hours. No suicidal/homicidal threat or behavior has been observed for the past 24 hours. There is no evidence of serious medication side effects. Patient has not been in physical or protective restraints for at least the past 24 hours. If weapons involved, how are they secured? No weapons involved. Is patient aware of and in agreement with discharge plan? Yes    Arrangements for medication:  Prescriptions sent to [facility].     Copy of discharge instructions to provider?:  Tai Dowd ([phone])    Arrangements for transportation home:  [place] all follow up appointments as scheduled, continue to take prescribed medications per physician instructions. Mental health crisis number:  828 or your local mental health crisis line number at [phone]. Tobi Caballero If I feel I am at risk of hurting myself or others, I will call the crisis office and speak with a crisis worker who will assist me during my crisis. Tobi Caballero .Ana Lester   [facility] [phone]  [address] [place] [address]  [facility]-  [phone]

## 2020-01-27 NOTE — BH NOTES
GROUP THERAPY PROGRESS NOTE    Patient is participating in Discharge Group. Group time: 45 minutes    Personal goal for participation: Process feelings related to discharge and communication issues with friends/family. Goal orientation: Personal    Group therapy participation: active    Therapeutic interventions reviewed and discussed: Group discussion was focused on discharge plans and anxiety related to this. Group members discussed what they planned to do once discharge and discharge plans. Discussion also related to support and communication issues that arise. Impression of participation: Jeremias Can shared that her goal for today is to be discharged and to be ready to talk to her treatment team about all needed appointments and things that have happened leading to admission.     Stiven Thrasher LPC [facility]

## 2020-01-27 NOTE — GROUP NOTE
FUNMILAYO  GROUP DOCUMENTATION INDIVIDUAL Group Therapy Note Date: 1/27/2020 Group Start Time: 1000 Group End Time: 1100 Group Topic: Topic Group [address] 3 ACUTE BEHAV [facility], [address] GROUP DOCUMENTATION GROUP Group Therapy Note Attendees: 7 Attendance: attended To participate in coping skills memory game Interventions/techniques: Supported-positive coping strategies a-z Follows Directions: Followed directions Interactions: Interacted appropriately Mental Status: Calm Behavior/appearance: Attentive and Cooperative Goals Achieved: Able to engage in interactions, Able to listen to others and Able to self-disclose Additional Notes:   
 
Ganesh Razo